# Patient Record
Sex: MALE | Race: WHITE | NOT HISPANIC OR LATINO | Employment: FULL TIME | URBAN - METROPOLITAN AREA
[De-identification: names, ages, dates, MRNs, and addresses within clinical notes are randomized per-mention and may not be internally consistent; named-entity substitution may affect disease eponyms.]

---

## 2017-06-20 ENCOUNTER — HISTORICAL (OUTPATIENT)
Dept: LAB | Facility: HOSPITAL | Age: 37
End: 2017-06-20

## 2017-06-20 LAB
ALBUMIN SERPL-MCNC: 3.1 GM/DL (ref 3.4–5)
ALP SERPL-CCNC: 100 UNIT/L (ref 30–113)
ALT SERPL-CCNC: 33 UNIT/L (ref 10–45)
AST SERPL-CCNC: 20 UNIT/L (ref 15–37)
BILIRUB SERPL-MCNC: 0.5 MG/DL (ref 0.1–0.9)
BILIRUBIN DIRECT+TOT PNL SERPL-MCNC: 0.1 MG/DL (ref 0–0.3)
BILIRUBIN DIRECT+TOT PNL SERPL-MCNC: 0.4 MG/DL
PROT SERPL-MCNC: 6.6 GM/DL (ref 6.4–8.2)

## 2019-04-29 ENCOUNTER — HISTORICAL (OUTPATIENT)
Dept: LAB | Facility: HOSPITAL | Age: 39
End: 2019-04-29

## 2019-10-03 ENCOUNTER — HISTORICAL (OUTPATIENT)
Dept: LAB | Facility: HOSPITAL | Age: 39
End: 2019-10-03

## 2019-10-03 LAB
ALBUMIN SERPL-MCNC: 3.2 GM/DL (ref 3.4–5)
ALP SERPL-CCNC: 82 UNIT/L (ref 30–113)
ALT SERPL-CCNC: 43 UNIT/L (ref 10–45)
AST SERPL-CCNC: 20 UNIT/L (ref 15–37)
BILIRUB SERPL-MCNC: 0.4 MG/DL (ref 0.1–0.9)
BILIRUBIN DIRECT+TOT PNL SERPL-MCNC: 0.1 MG/DL (ref 0–0.3)
BILIRUBIN DIRECT+TOT PNL SERPL-MCNC: 0.3 MG/DL
PLATELET # BLD AUTO: 152 X10(3)/MCL (ref 140–400)
PROT SERPL-MCNC: 6.8 GM/DL (ref 6.4–8.2)
VALPROATE SERPL-MCNC: 56.6 MCG/ML (ref 50–100)

## 2020-10-26 ENCOUNTER — HISTORICAL (OUTPATIENT)
Dept: LAB | Facility: HOSPITAL | Age: 40
End: 2020-10-26

## 2020-10-26 LAB
ALBUMIN SERPL-MCNC: 3.2 GM/DL (ref 3.5–5)
ALP SERPL-CCNC: 79 UNIT/L (ref 40–150)
ALT SERPL-CCNC: 45 UNIT/L (ref 0–55)
AST SERPL-CCNC: 29 UNIT/L (ref 5–34)
BILIRUB SERPL-MCNC: 0.4 MG/DL
BILIRUBIN DIRECT+TOT PNL SERPL-MCNC: 0.2 MG/DL (ref 0–0.5)
BILIRUBIN DIRECT+TOT PNL SERPL-MCNC: 0.2 MG/DL (ref 0–0.8)
PLATELET # BLD AUTO: 120 X10(3)/MCL (ref 140–400)
PROT SERPL-MCNC: 6.6 GM/DL (ref 6.4–8.3)
VALPROATE SERPL-MCNC: 69.9 UG/ML (ref 50–100)

## 2022-06-08 ENCOUNTER — LAB VISIT (OUTPATIENT)
Dept: LAB | Facility: HOSPITAL | Age: 42
End: 2022-06-08
Attending: PSYCHIATRY & NEUROLOGY
Payer: COMMERCIAL

## 2022-06-08 DIAGNOSIS — F63.81 INTERMITTENT EXPLOSIVE DISORDER: Primary | ICD-10-CM

## 2022-06-08 LAB
ALBUMIN SERPL-MCNC: 3.8 GM/DL (ref 3.5–5)
ALP SERPL-CCNC: 80 UNIT/L (ref 40–150)
ALT SERPL-CCNC: 15 UNIT/L (ref 0–55)
AST SERPL-CCNC: 15 UNIT/L (ref 5–34)
BILIRUBIN DIRECT+TOT PNL SERPL-MCNC: 0.2 MG/DL (ref 0–0.5)
BILIRUBIN DIRECT+TOT PNL SERPL-MCNC: 0.4 MG/DL (ref 0–0.8)
BILIRUBIN DIRECT+TOT PNL SERPL-MCNC: 0.6 MG/DL
PLATELET # BLD AUTO: 234 X10(3)/MCL (ref 130–400)
PROT SERPL-MCNC: 7.3 GM/DL (ref 6.4–8.3)
VALPROATE SERPL-MCNC: 27.7 UG/ML (ref 50–100)

## 2022-06-08 PROCEDURE — 80076 HEPATIC FUNCTION PANEL: CPT

## 2022-06-08 PROCEDURE — 85049 AUTOMATED PLATELET COUNT: CPT

## 2022-06-08 PROCEDURE — 36415 COLL VENOUS BLD VENIPUNCTURE: CPT

## 2022-06-08 PROCEDURE — 80164 ASSAY DIPROPYLACETIC ACD TOT: CPT

## 2023-08-16 ENCOUNTER — LAB VISIT (OUTPATIENT)
Dept: LAB | Facility: HOSPITAL | Age: 43
End: 2023-08-16
Attending: NURSE PRACTITIONER
Payer: COMMERCIAL

## 2023-08-16 DIAGNOSIS — F63.81 INTERMITTENT EXPLOSIVE DISORDER: Primary | ICD-10-CM

## 2023-08-16 LAB
ALBUMIN SERPL-MCNC: 3.9 G/DL (ref 3.5–5)
ALP SERPL-CCNC: 89 UNIT/L (ref 40–150)
ALT SERPL-CCNC: 24 UNIT/L (ref 0–55)
AST SERPL-CCNC: 21 UNIT/L (ref 5–34)
BILIRUB SERPL-MCNC: 0.4 MG/DL
BILIRUBIN DIRECT+TOT PNL SERPL-MCNC: 0.1 MG/DL (ref 0–?)
BILIRUBIN DIRECT+TOT PNL SERPL-MCNC: 0.3 MG/DL (ref 0–0.8)
PROT SERPL-MCNC: 7.5 GM/DL (ref 6.4–8.3)
VALPROATE SERPL-MCNC: 49.2 UG/ML (ref 50–100)

## 2023-08-16 PROCEDURE — 80164 ASSAY DIPROPYLACETIC ACD TOT: CPT

## 2023-08-16 PROCEDURE — 80076 HEPATIC FUNCTION PANEL: CPT

## 2023-08-16 PROCEDURE — 36415 COLL VENOUS BLD VENIPUNCTURE: CPT

## 2024-03-23 ENCOUNTER — HOSPITAL ENCOUNTER (EMERGENCY)
Facility: HOSPITAL | Age: 44
Discharge: HOME | End: 2024-03-24
Attending: GENERAL ACUTE CARE HOSPITAL
Payer: COMMERCIAL

## 2024-03-23 DIAGNOSIS — T78.40XA ALLERGIC REACTION, INITIAL ENCOUNTER: Primary | ICD-10-CM

## 2024-03-23 DIAGNOSIS — L50.9 URTICARIA: ICD-10-CM

## 2024-03-23 PROCEDURE — 25000003 PHARM REV CODE 250: Performed by: GENERAL ACUTE CARE HOSPITAL

## 2024-03-23 PROCEDURE — 94640 AIRWAY INHALATION TREATMENT: CPT

## 2024-03-23 PROCEDURE — 96375 TX/PRO/DX INJ NEW DRUG ADDON: CPT

## 2024-03-23 PROCEDURE — 96361 HYDRATE IV INFUSION ADD-ON: CPT

## 2024-03-23 PROCEDURE — 63600175 PHARM REV CODE 636 W HCPCS: Performed by: GENERAL ACUTE CARE HOSPITAL

## 2024-03-23 PROCEDURE — 99284 EMERGENCY DEPT VISIT MOD MDM: CPT | Mod: 25

## 2024-03-23 PROCEDURE — 99900031 HC PATIENT EDUCATION (STAT)

## 2024-03-23 PROCEDURE — 25000242 PHARM REV CODE 250 ALT 637 W/ HCPCS: Performed by: GENERAL ACUTE CARE HOSPITAL

## 2024-03-23 PROCEDURE — 96374 THER/PROPH/DIAG INJ IV PUSH: CPT

## 2024-03-23 PROCEDURE — 99900035 HC TECH TIME PER 15 MIN (STAT)

## 2024-03-23 RX ORDER — FAMOTIDINE 10 MG/ML
20 INJECTION INTRAVENOUS
Status: COMPLETED | OUTPATIENT
Start: 2024-03-23 | End: 2024-03-23

## 2024-03-23 RX ORDER — EPINEPHRINE 1 MG/ML
INJECTION, SOLUTION, CONCENTRATE INTRAVENOUS
Status: DISCONTINUED
Start: 2024-03-23 | End: 2024-03-24 | Stop reason: HOSPADM

## 2024-03-23 RX ORDER — DIPHENHYDRAMINE HYDROCHLORIDE 50 MG/ML
50 INJECTION INTRAMUSCULAR; INTRAVENOUS
Status: COMPLETED | OUTPATIENT
Start: 2024-03-23 | End: 2024-03-23

## 2024-03-23 RX ORDER — METHYLPREDNISOLONE SOD SUCC 125 MG
125 VIAL (EA) INJECTION
Status: COMPLETED | OUTPATIENT
Start: 2024-03-23 | End: 2024-03-23

## 2024-03-23 RX ORDER — EPINEPHRINE 1 MG/ML
0.3 INJECTION INTRAMUSCULAR; INTRAVENOUS; SUBCUTANEOUS
Status: DISCONTINUED | OUTPATIENT
Start: 2024-03-23 | End: 2024-03-24 | Stop reason: HOSPADM

## 2024-03-23 RX ORDER — IPRATROPIUM BROMIDE AND ALBUTEROL SULFATE 2.5; .5 MG/3ML; MG/3ML
3 SOLUTION RESPIRATORY (INHALATION)
Status: COMPLETED | OUTPATIENT
Start: 2024-03-23 | End: 2024-03-23

## 2024-03-23 RX ADMIN — IPRATROPIUM BROMIDE AND ALBUTEROL SULFATE 3 ML: .5; 3 SOLUTION RESPIRATORY (INHALATION) at 11:03

## 2024-03-23 RX ADMIN — METHYLPREDNISOLONE SODIUM SUCCINATE 125 MG: 125 INJECTION, POWDER, FOR SOLUTION INTRAMUSCULAR; INTRAVENOUS at 11:03

## 2024-03-23 RX ADMIN — SODIUM CHLORIDE 1000 ML: 9 INJECTION, SOLUTION INTRAVENOUS at 11:03

## 2024-03-23 RX ADMIN — DIPHENHYDRAMINE HYDROCHLORIDE 50 MG: 50 INJECTION INTRAMUSCULAR; INTRAVENOUS at 11:03

## 2024-03-23 RX ADMIN — FAMOTIDINE 20 MG: 10 INJECTION, SOLUTION INTRAVENOUS at 11:03

## 2024-03-24 VITALS
OXYGEN SATURATION: 100 % | TEMPERATURE: 98 F | RESPIRATION RATE: 19 BRPM | BODY MASS INDEX: 29.92 KG/M2 | HEIGHT: 69 IN | DIASTOLIC BLOOD PRESSURE: 72 MMHG | SYSTOLIC BLOOD PRESSURE: 118 MMHG | HEART RATE: 80 BPM | WEIGHT: 202 LBS

## 2024-03-24 RX ORDER — EPINEPHRINE 0.3 MG/.3ML
1 INJECTION SUBCUTANEOUS ONCE
Qty: 0.3 ML | Refills: 0 | Status: SHIPPED | OUTPATIENT
Start: 2024-03-24 | End: 2024-03-24

## 2024-03-24 NOTE — ED PROVIDER NOTES
Encounter Date: 3/23/2024       History     Chief Complaint   Patient presents with    Allergic Reaction     C/o hives and redness.      Patient came in emergency room with chief complaints on a wheezing, skin rash with pruritus, shortness of breath for 30 minutes, he allergic to crawfish/shellfish, reports that he was eating some [place] from [place] few hours ago, symptoms started only 30 minutes ago, he has no previous history of COPD, but reports remote history of tobacco smoking (10 ppd), quit 15 years ago    The history is provided by the patient and the spouse.     Review of patient's allergies indicates:   Allergen Reactions    Shellfish containing products Other (See Comments)     Past Medical History:   Diagnosis Date    Anger      Past Surgical History:   Procedure Laterality Date    TONSILLECTOMY       History reviewed. No pertinent family history.  Social History     Tobacco Use    Smoking status: Former     Types: Cigarettes    Smokeless tobacco: Current     Types: Chew   Substance Use Topics    Alcohol use: Yes    Drug use: Not Currently     Review of Systems   Respiratory:  Positive for shortness of breath and wheezing.    Skin:  Positive for color change and rash.   All other systems reviewed and are negative.      Physical Exam     Initial Vitals [03/23/24 2304]   BP Pulse Resp Temp SpO2   (!) 130/97 97 (!) 22 97.7 °F (36.5 °C) 98 %      MAP       --         Physical Exam    Nursing note and vitals reviewed.  Constitutional: He appears well-developed and well-nourished. He has a sickly appearance.   HENT:   Head:       Left Ear: External ear normal.   Mouth/Throat: Oropharynx is clear and moist.   Facial erythema with swelling   Eyes: Pupils are equal, round, and reactive to light.   Neck:   Normal range of motion.  Cardiovascular:    Tachycardia present.         Pulmonary/Chest: Accessory muscle usage present. Tachypnea noted. He is in respiratory distress. He has wheezes in the right upper  field, the right middle field, the right lower field, the left upper field, the left middle field and the left lower field.   Abdominal: Abdomen is soft.   Musculoskeletal:         General: Normal range of motion.      Cervical back: Normal range of motion.     Neurological: He is alert and oriented to person, place, and time.   Skin: Skin is warm. Capillary refill takes 2 to 3 seconds. Rash noted. Rash is urticarial.        Psychiatric: His behavior is normal. Thought content normal.         ED Course   Procedures  Labs Reviewed - No data to display       Imaging Results    None          Medications   EPINEPHrine injection 0.3 mg (has no administration in time range)   EPINEPHrine (PF) (ADRENALIN) 1 mg/mL (1 mL) injection (has no administration in time range)   sodium chloride 0.9% bolus 1,000 mL 1,000 mL (0 mLs Intravenous Stopped 3/24/24 0011)   methylPREDNISolone sodium succinate injection 125 mg (125 mg Intravenous Given 3/23/24 2334)   famotidine (PF) injection 20 mg (20 mg Intravenous Given 3/23/24 2334)   diphenhydrAMINE injection 50 mg (50 mg Intravenous Given 3/23/24 2334)   albuterol-ipratropium 2.5 mg-0.5 mg/3 mL nebulizer solution 3 mL (3 mLs Nebulization Given 3/23/24 2326)     Medical Decision Making  Patient came in emergency room with chief complaints on a wheezing, skin rash with pruritus, shortness of breath for 30 minutes, he allergic to crawfish/shellfish, reports that he was eating some [place] from [place] few hours ago, symptoms started only 30 minutes ago, he has no previous history of COPD, but reports remote history of tobacco smoking (10 ppd), quit 15 years ago    Vital signs revealed tachypnea with oxygen 98%.  New line physical exam revealed erythema of whole face, there is a pruritic urticarial skin rash on a back and chest, upper extremities, tachycardia on cardiac exam, diffuse expiratory wheezing    Amount and/or Complexity of Data Reviewed  Discussion of management or test  interpretation with external provider(s): Diff diagnosis:  Contact dermatitis, psoriasis, cellulitis, angioedema, allergic reaction, COPD.    Patient received DuoNeb nebulizer treatment, famotidine 40 mg IV, Solu-Medrol 125 mg IV and Benadryl 50 mg IV, patient's condition significantly improved, wheezing resolved    Risk  Prescription drug management.               ED Course as of 03/24/24 0028   Sun Mar 24, 2024   0028 Patient is willing very well, no shortness of breath, speaks full sentences, stable for discharge [IP]      ED Course User Index  [IP] Lila Beebe MD                           Clinical Impression:  Final diagnoses:  [T78.40XA] Allergic reaction, initial encounter (Primary)  [L50.9] Urticaria          ED Disposition Condition    Discharge Stable          ED Prescriptions       Medication Sig Dispense Start Date End Date Auth. Provider    EPINEPHrine (EPIPEN) 0.3 mg/0.3 mL AtIn (Expires today) Inject 0.3 mLs (0.3 mg total) into the muscle once. for 1 dose 0.3 mL 3/24/2024 3/24/2024 Lila Beebe MD          Follow-up Information    None          Lila Beebe MD  03/24/24 0028